# Patient Record
Sex: MALE | Race: WHITE | Employment: UNEMPLOYED | URBAN - NONMETROPOLITAN AREA
[De-identification: names, ages, dates, MRNs, and addresses within clinical notes are randomized per-mention and may not be internally consistent; named-entity substitution may affect disease eponyms.]

---

## 2021-09-14 ENCOUNTER — TELEPHONE (OUTPATIENT)
Dept: UROLOGY | Age: 41
End: 2021-09-14

## 2021-09-14 NOTE — TELEPHONE ENCOUNTER
Jeannette Jacobo from [address] [phone] called to schedule appt for this INMATE    Partial faxes received and requested disk with history of testicular carcinoma. (1) referral needs to be entered (2) Patient will need to be scheduled with Dr Theodora Ivey by calling Jeannette Jacobo (3) patient will need FULL registration before being seen.

## 2021-10-25 NOTE — TELEPHONE ENCOUNTER
Before scheduled as New Patient with Dr Zoe Grady, multiple attempts were made to acquire disk/film on patient before new patient appt today    Upon arrival at clinic, discovered that disk/film has not been received and/or jail transport did not bring disk/film. Upon deeper discussion with jail transport and patient - due to Vlad Hill MD not seeing any evidence of enlargement, jail would not be sending patient back to him for additional testing. Patient has documented enlargement though jail    Patient advised that last disk/film patient would have on record would be from [facility] in August 2020. [facility] staff took contact number, filled our request for information and sent to [facility] facility. [address] [place], [place] [address]k staff was given a [place] contact number [phone] to speak with for DISK/FILM.     [facility] staff cancelled patient for today, re-opened referral and will wait until received disk/film until rescheduling appt

## 2022-01-05 ENCOUNTER — OFFICE VISIT (OUTPATIENT)
Dept: UROLOGY | Age: 42
End: 2022-01-05
Payer: COMMERCIAL

## 2022-01-05 VITALS — TEMPERATURE: 98.2 F | BODY MASS INDEX: 33.02 KG/M2 | HEIGHT: 67 IN | WEIGHT: 210.4 LBS

## 2022-01-05 DIAGNOSIS — N50.89 MASS OF LEFT TESTICLE: Primary | ICD-10-CM

## 2022-01-05 LAB
APPEARANCE FLUID: CLEAR
BILIRUBIN, POC: NORMAL
BLOOD URINE, POC: NORMAL
CLARITY, POC: CLEAR
COLOR, POC: YELLOW
GLUCOSE URINE, POC: 100
KETONES, POC: NORMAL
LEUKOCYTE EST, POC: NORMAL
NITRITE, POC: NORMAL
PH, POC: 5.5
PROTEIN, POC: NORMAL
SPECIFIC GRAVITY, POC: 1.02
UROBILINOGEN, POC: 0.2

## 2022-01-05 PROCEDURE — 81002 URINALYSIS NONAUTO W/O SCOPE: CPT | Performed by: UROLOGY

## 2022-01-05 PROCEDURE — 99204 OFFICE O/P NEW MOD 45 MIN: CPT | Performed by: UROLOGY

## 2022-01-05 ASSESSMENT — ENCOUNTER SYMPTOMS
CHEST TIGHTNESS: 0
NAUSEA: 0
EYE DISCHARGE: 0
VOMITING: 0
EYE REDNESS: 0
WHEEZING: 0
BACK PAIN: 0
SORE THROAT: 0
FACIAL SWELLING: 0

## 2022-01-05 NOTE — PROGRESS NOTES
Jazmyn Sanderson is a 37 y.o. male who presents today   Chief Complaint   Patient presents with    Follow-up     I am a new pt today being seen for abnormal scrotal us. I brought my disc. Testicular mass  Patient is an inmate in the correctional facility in [place]. He states initially saw a urologist April 2020 for complaints of a left testicular nodule. I reviewed the records there was complaint of testalgia but no nodule was seen or palpated. This left testicle increased in size and patient underwent a testicular ultrasound done on 8/26/2021 and this showed hypoechoic masses intraparenchymal in the left testis. Referral was made. His initial visit October 2021 he did not have his ultrasound disc with him so this was rescheduled. He is  just now here for follow-up evaluation. He denies any weight loss or constitutional symptoms no abdominal pain he says he did have some back pain doing some exercising. He reports the left testicle has gotten larger in size creates a sort of pulling sensation but he is not having any excruciating pain no erythema no difficulty urinating or dysuria. He did have some labs drawn on 12/14/2021 which included a CMP showed normal LFTs creatinine 1.28 GFR greater than 60 H&H was 15.1 and 42.6 white blood cell count 3.9. Alpha-fetoprotein tumor marker was normal at 2.1 and total quantitative hCG was 9 just slightly elevated. He does bring his disc in today. History reviewed. No pertinent past medical history. Past Surgical History:   Procedure Laterality Date    FEMUR FRACTURE SURGERY Left     HEMORRHOID SURGERY  2020    internal hemorrhoids. Jany        No current outpatient medications on file. No current facility-administered medications for this visit.        Allergies   Allergen Reactions    Aspirin Rash       Social History     Socioeconomic History    Marital status: Single     Spouse name: None    Number of children: None    Years of education: None    Highest education level: None   Occupational History    None   Tobacco Use    Smoking status: Never Smoker    Smokeless tobacco: Never Used   Substance and Sexual Activity    Alcohol use: None    Drug use: None    Sexual activity: None   Other Topics Concern    None   Social History Narrative    None     Social Determinants of Health     Financial Resource Strain:     Difficulty of Paying Living Expenses: Not on file   Food Insecurity:     Worried About Running Out of Food in the Last Year: Not on file    Jewels of Food in the Last Year: Not on file   Transportation Needs:     Lack of Transportation (Medical): Not on file    Lack of Transportation (Non-Medical): Not on file   Physical Activity:     Days of Exercise per Week: Not on file    Minutes of Exercise per Session: Not on file   Stress:     Feeling of Stress : Not on file   Social Connections:     Frequency of Communication with Friends and Family: Not on file    Frequency of Social Gatherings with Friends and Family: Not on file    Attends Synagogue Services: Not on file    Active Member of [address] [place] [address] or Organizations: Not on file    Attends Club or Organization Meetings: Not on file    Marital Status: Not on file   Intimate Partner Violence:     Fear of Current or Ex-Partner: Not on file    Emotionally Abused: Not on file    Physically Abused: Not on file    Sexually Abused: Not on file   Housing Stability:     Unable to Pay for Housing in the Last Year: Not on file    Number of [place] in the Last Year: Not on file    Unstable Housing in the Last Year: Not on file       History reviewed. No pertinent family history. REVIEW OF SYSTEMS:  Review of Systems   Constitutional: Negative for chills and fever. HENT: Negative for facial swelling and sore throat. Eyes: Negative for discharge and redness. Respiratory: Negative for chest tightness and wheezing. Cardiovascular: Negative for chest pain and palpitations. Gastrointestinal: Negative for nausea and vomiting. Endocrine: Negative for polyphagia and polyuria. Genitourinary: Positive for scrotal swelling (left side, papable reports it has grown over time. First found 1/19.  ). Negative for decreased urine volume, difficulty urinating, dysuria, enuresis, flank pain, frequency, genital sores, hematuria, penile discharge, penile pain, penile swelling, testicular pain and urgency. Musculoskeletal: Negative for back pain and neck stiffness. Skin: Negative for rash and wound. Neurological: Negative for dizziness and headaches. Hematological: Negative for adenopathy. Does not bruise/bleed easily. Psychiatric/Behavioral: Negative for confusion and hallucinations. PHYSICAL EXAM:  Temp 98.2 °F (36.8 °C)   Ht 5' 7\" (1.702 m)   Wt 210 lb 6.4 oz (95.4 kg)   BMI 32.95 kg/m²   Physical Exam  Constitutional:       General: He is not in acute distress. Appearance: Normal appearance. He is well-developed. HENT:      Head: Normocephalic and atraumatic. Nose: Nose normal.   Eyes:      General: No scleral icterus. Conjunctiva/sclera: Conjunctivae normal.      Pupils: Pupils are equal, round, and reactive to light. Neck:      Trachea: No tracheal deviation. Cardiovascular:      Rate and Rhythm: Normal rate and regular rhythm. Pulmonary:      Effort: Pulmonary effort is normal. No respiratory distress. Breath sounds: No stridor. Abdominal:      General: There is no distension. Palpations: Abdomen is soft. There is no mass (He has some mild fullness in the). Tenderness: There is no abdominal tenderness. Comments: He has firmness on deep palpation mid supra umbilical to deep palpation can not r/o mass   Genitourinary:     Penis: Normal and circumcised. Testes:         Right: Mass not present. Left: Mass present. Tenderness not present. Comments: Firm irregular mass involving the left testicle.   Normal scrotal skin.  Musculoskeletal:         General: No tenderness. Normal range of motion. Cervical back: Normal range of motion and neck supple. Lymphadenopathy:      Cervical: No cervical adenopathy. Skin:     General: Skin is warm and dry. Findings: No erythema. Neurological:      Mental Status: He is alert and oriented to person, place, and time. Psychiatric:         Behavior: Behavior normal.         Judgment: Judgment normal.              DATA:    Results for orders placed or performed in visit on 01/05/22   POCT Urinalysis no Micro   Result Value Ref Range    Color, UA yellow     Clarity, UA clear     Glucose, UA      Bilirubin, UA neg     Ketones, UA neg     Spec Grav, UA 1.020     Blood, UA POC neg     pH, UA 5.5     Protein, UA POC neg     Urobilinogen, UA 0.2     Leukocytes, UA neg     Nitrite, UA neg     Appearance, Fluid Clear Clear, Slightly Cloudy     Labs none at the present on 12/13/2021 were reviewed as above. Normal alpha-fetoprotein 2.1  Beta hCG was 9. Normal <5 very slightly elevated    IMAGING:  He does bring the disc in today I was able to examine that this is from August 26, 2021 this does show enlargement of the left testicle with a 2.4 and 2 cm intraparenchymal hypoechoic masses. 1. Mass of left testicle  History and physical findings are consistent with probable testis cancer without marker elevation. I will go ahead and order staging chest CT scan of the abdomen pelvis since there is delayed presentation. In addition need to get him scheduled ASAP for left radical orchiectomy inguinal approach. - POCT Urinalysis no Micro  - CT CHEST W CONTRAST;  Future  - CT ABDOMEN PELVIS W WO CONTRAST Additional Contrast? Oral; Future      Orders Placed This Encounter   Procedures    CT CHEST W CONTRAST     Standing Status:   Future     Standing Expiration Date:   1/5/2023     Order Specific Question:   Reason for exam:     Answer:   Rule out pulmonary metastasis    CT ABDOMEN PELVIS W WO CONTRAST Additional Contrast? Oral     Standing Status:   Future     Standing Expiration Date:   1/5/2023     Order Specific Question:   Additional Contrast?     Answer:   Oral     Order Specific Question:   Reason for exam:     Answer:   Rule out retroperitoneal metastasis    POCT Urinalysis no Micro        Return for PT to be scheduled for Surgery. All information inputted into the note by the MA to include chief complaint, past medical history, past surgical history, medications, allergies, social and family history and review of systems has been reviewed and updated as needed by me. EMR Dragon/transcription disclaimer: Much of this documentt is electronic  transcription/translation of spoken language to printed text. The  electronic translation of spoken language may be erroneous, or at times,  nonsensical words or phrases may be inadvertently transcribed.  Although I  have reviewed the document for such errors, some may still exist.

## 2022-01-10 ENCOUNTER — TELEPHONE (OUTPATIENT)
Dept: UROLOGY | Age: 42
End: 2022-01-10

## 2022-01-10 NOTE — TELEPHONE ENCOUNTER
Patient surgery on for tomorrow 1/11/22 was cancelled by the [facility]. Per Rahul Angel all appts and surgeries must be approved first. I faxed note, ct orders, and all pre work orders to f: [phone] and informed Rahul Angel that this needed to be reviewed STAT due to patient urgent diagnosis. Rahul Angel stated hopefully they would have approval by tomorrow and will call us back to schedule. Per Rahul Angel They will schedule all testing there and will have patient bring films on disc day of surgery.

## 2022-01-13 NOTE — TELEPHONE ENCOUNTER
I called the senior care again and spoke to [facility] to check the status of the approval for patient surgery. She stated it still had not been approved and would hopefully know something soon. I again stressed the urgency of the patients need for surgery. I also faxed a faced sheet stating patient ct scans can wait until after surgery to prevent any delay per Dr Eyal Tran.

## 2022-01-19 ENCOUNTER — TELEPHONE (OUTPATIENT)
Dept: UROLOGY | Age: 42
End: 2022-01-19

## 2022-01-19 NOTE — TELEPHONE ENCOUNTER
Leslee from the [facility] scheduling Dept request that the office return her call regarding when the patient should arrive for his scheduled surgery on 2/1. Also when should the patient have his CT of the abdomen and pelvis done. She also ask to be contacted first in regards to scheduling his post op appt. Please return call to Leslee. Thank you!     DF:[phone]

## 2022-02-01 ENCOUNTER — APPOINTMENT (OUTPATIENT)
Dept: CT IMAGING | Age: 42
End: 2022-02-01
Attending: UROLOGY
Payer: COMMERCIAL

## 2022-02-01 ENCOUNTER — HOSPITAL ENCOUNTER (OUTPATIENT)
Age: 42
Setting detail: OUTPATIENT SURGERY
Discharge: HOME | End: 2022-02-01
Attending: UROLOGY | Admitting: UROLOGY
Payer: COMMERCIAL

## 2022-02-01 ENCOUNTER — ANESTHESIA EVENT (OUTPATIENT)
Dept: OPERATING ROOM | Age: 42
End: 2022-02-01
Payer: COMMERCIAL

## 2022-02-01 ENCOUNTER — ANESTHESIA (OUTPATIENT)
Dept: OPERATING ROOM | Age: 42
End: 2022-02-01
Payer: COMMERCIAL

## 2022-02-01 VITALS
SYSTOLIC BLOOD PRESSURE: 152 MMHG | HEART RATE: 69 BPM | TEMPERATURE: 98.2 F | BODY MASS INDEX: 32.96 KG/M2 | WEIGHT: 210 LBS | RESPIRATION RATE: 16 BRPM | OXYGEN SATURATION: 97 % | HEIGHT: 67 IN | DIASTOLIC BLOOD PRESSURE: 98 MMHG

## 2022-02-01 VITALS — OXYGEN SATURATION: 99 % | TEMPERATURE: 97.6 F | DIASTOLIC BLOOD PRESSURE: 52 MMHG | SYSTOLIC BLOOD PRESSURE: 95 MMHG

## 2022-02-01 DIAGNOSIS — N50.89 MASS OF LEFT TESTICLE: Primary | ICD-10-CM

## 2022-02-01 LAB
ALBUMIN SERPL-MCNC: 4.9 G/DL (ref 3.5–5.2)
ALP BLD-CCNC: 98 U/L (ref 40–130)
ALPHA FETOPROTEIN: 2.3 NG/ML (ref 0–8.3)
ALT SERPL-CCNC: 41 U/L (ref 5–41)
ANION GAP SERPL CALCULATED.3IONS-SCNC: 10 MMOL/L (ref 7–19)
APTT: 26.4 SEC (ref 26–36.2)
AST SERPL-CCNC: 29 U/L (ref 5–40)
BASOPHILS ABSOLUTE: 0.1 K/UL (ref 0–0.2)
BASOPHILS RELATIVE PERCENT: 0.9 % (ref 0–1)
BILIRUB SERPL-MCNC: 0.7 MG/DL (ref 0.2–1.2)
BUN BLDV-MCNC: 11 MG/DL (ref 6–20)
CALCIUM SERPL-MCNC: 9.6 MG/DL (ref 8.6–10)
CHLORIDE BLD-SCNC: 100 MMOL/L (ref 98–111)
CO2: 26 MMOL/L (ref 22–29)
CREAT SERPL-MCNC: 1 MG/DL (ref 0.5–1.2)
EOSINOPHILS ABSOLUTE: 0.3 K/UL (ref 0–0.6)
EOSINOPHILS RELATIVE PERCENT: 5.8 % (ref 0–5)
GFR AFRICAN AMERICAN: >59
GFR NON-AFRICAN AMERICAN: >60
GLUCOSE BLD-MCNC: 97 MG/DL (ref 74–109)
HCT VFR BLD CALC: 49.1 % (ref 42–52)
HEMOGLOBIN: 16.1 G/DL (ref 14–18)
IMMATURE GRANULOCYTES #: 0 K/UL
INR BLD: 0.97 (ref 0.88–1.18)
LYMPHOCYTES ABSOLUTE: 1.6 K/UL (ref 1.1–4.5)
LYMPHOCYTES RELATIVE PERCENT: 28.4 % (ref 20–40)
MCH RBC QN AUTO: 29.8 PG (ref 27–31)
MCHC RBC AUTO-ENTMCNC: 32.8 G/DL (ref 33–37)
MCV RBC AUTO: 90.9 FL (ref 80–94)
MONOCYTES ABSOLUTE: 0.6 K/UL (ref 0–0.9)
MONOCYTES RELATIVE PERCENT: 10.8 % (ref 0–10)
NEUTROPHILS ABSOLUTE: 3.1 K/UL (ref 1.5–7.5)
NEUTROPHILS RELATIVE PERCENT: 53.6 % (ref 50–65)
PDW BLD-RTO: 11.9 % (ref 11.5–14.5)
PLATELET # BLD: 203 K/UL (ref 130–400)
PMV BLD AUTO: 9.9 FL (ref 9.4–12.4)
POTASSIUM SERPL-SCNC: 4.3 MMOL/L (ref 3.5–4.9)
PROTHROMBIN TIME: 13.1 SEC (ref 12–14.6)
RBC # BLD: 5.4 M/UL (ref 4.7–6.1)
SODIUM BLD-SCNC: 136 MMOL/L (ref 136–145)
TOTAL PROTEIN: 7.7 G/DL (ref 6.6–8.7)
WBC # BLD: 5.7 K/UL (ref 4.8–10.8)

## 2022-02-01 PROCEDURE — 6370000000 HC RX 637 (ALT 250 FOR IP): Performed by: UROLOGY

## 2022-02-01 PROCEDURE — 7100000011 HC PHASE II RECOVERY - ADDTL 15 MIN: Performed by: UROLOGY

## 2022-02-01 PROCEDURE — 2580000003 HC RX 258: Performed by: UROLOGY

## 2022-02-01 PROCEDURE — 2500000003 HC RX 250 WO HCPCS: Performed by: UROLOGY

## 2022-02-01 PROCEDURE — 82105 ALPHA-FETOPROTEIN SERUM: CPT

## 2022-02-01 PROCEDURE — 88309 TISSUE EXAM BY PATHOLOGIST: CPT

## 2022-02-01 PROCEDURE — 54530 REMOVAL OF TESTIS: CPT | Performed by: UROLOGY

## 2022-02-01 PROCEDURE — 74177 CT ABD & PELVIS W/CONTRAST: CPT

## 2022-02-01 PROCEDURE — 3700000000 HC ANESTHESIA ATTENDED CARE: Performed by: UROLOGY

## 2022-02-01 PROCEDURE — 3600000005 HC SURGERY LEVEL 5 BASE: Performed by: UROLOGY

## 2022-02-01 PROCEDURE — 2500000003 HC RX 250 WO HCPCS: Performed by: NURSE ANESTHETIST, CERTIFIED REGISTERED

## 2022-02-01 PROCEDURE — 7100000001 HC PACU RECOVERY - ADDTL 15 MIN: Performed by: UROLOGY

## 2022-02-01 PROCEDURE — 6360000004 HC RX CONTRAST MEDICATION: Performed by: UROLOGY

## 2022-02-01 PROCEDURE — 3700000001 HC ADD 15 MINUTES (ANESTHESIA): Performed by: UROLOGY

## 2022-02-01 PROCEDURE — 6360000002 HC RX W HCPCS: Performed by: UROLOGY

## 2022-02-01 PROCEDURE — 85730 THROMBOPLASTIN TIME PARTIAL: CPT

## 2022-02-01 PROCEDURE — 85610 PROTHROMBIN TIME: CPT

## 2022-02-01 PROCEDURE — 7100000010 HC PHASE II RECOVERY - FIRST 15 MIN: Performed by: UROLOGY

## 2022-02-01 PROCEDURE — 7100000000 HC PACU RECOVERY - FIRST 15 MIN: Performed by: UROLOGY

## 2022-02-01 PROCEDURE — 36415 COLL VENOUS BLD VENIPUNCTURE: CPT

## 2022-02-01 PROCEDURE — 71260 CT THORAX DX C+: CPT

## 2022-02-01 PROCEDURE — 2580000003 HC RX 258: Performed by: NURSE ANESTHETIST, CERTIFIED REGISTERED

## 2022-02-01 PROCEDURE — A4217 STERILE WATER/SALINE, 500 ML: HCPCS | Performed by: UROLOGY

## 2022-02-01 PROCEDURE — 3600000015 HC SURGERY LEVEL 5 ADDTL 15MIN: Performed by: UROLOGY

## 2022-02-01 PROCEDURE — 85025 COMPLETE CBC W/AUTO DIFF WBC: CPT

## 2022-02-01 PROCEDURE — 84704 HCG FREE BETACHAIN TEST: CPT

## 2022-02-01 PROCEDURE — 80053 COMPREHEN METABOLIC PANEL: CPT

## 2022-02-01 PROCEDURE — 6360000002 HC RX W HCPCS: Performed by: NURSE ANESTHETIST, CERTIFIED REGISTERED

## 2022-02-01 PROCEDURE — 2709999900 HC NON-CHARGEABLE SUPPLY: Performed by: UROLOGY

## 2022-02-01 RX ORDER — PROPOFOL 10 MG/ML
INJECTION, EMULSION INTRAVENOUS PRN
Status: DISCONTINUED | OUTPATIENT
Start: 2022-02-01 | End: 2022-02-01 | Stop reason: SDUPTHER

## 2022-02-01 RX ORDER — HYDROMORPHONE HYDROCHLORIDE 1 MG/ML
0.5 INJECTION, SOLUTION INTRAMUSCULAR; INTRAVENOUS; SUBCUTANEOUS EVERY 5 MIN PRN
Status: DISCONTINUED | OUTPATIENT
Start: 2022-02-01 | End: 2022-02-01 | Stop reason: HOSPADM

## 2022-02-01 RX ORDER — SODIUM CHLORIDE 9 MG/ML
INJECTION, SOLUTION INTRAVENOUS CONTINUOUS
Status: DISCONTINUED | OUTPATIENT
Start: 2022-02-01 | End: 2022-02-01 | Stop reason: HOSPADM

## 2022-02-01 RX ORDER — ONDANSETRON 2 MG/ML
INJECTION INTRAMUSCULAR; INTRAVENOUS PRN
Status: DISCONTINUED | OUTPATIENT
Start: 2022-02-01 | End: 2022-02-01 | Stop reason: SDUPTHER

## 2022-02-01 RX ORDER — DEXAMETHASONE SODIUM PHOSPHATE 10 MG/ML
10 INJECTION, SOLUTION INTRAMUSCULAR; INTRAVENOUS ONCE
Status: DISCONTINUED | OUTPATIENT
Start: 2022-02-01 | End: 2022-02-01 | Stop reason: HOSPADM

## 2022-02-01 RX ORDER — MIDAZOLAM HYDROCHLORIDE 1 MG/ML
2 INJECTION INTRAMUSCULAR; INTRAVENOUS
Status: DISCONTINUED | OUTPATIENT
Start: 2022-02-01 | End: 2022-02-01 | Stop reason: HOSPADM

## 2022-02-01 RX ORDER — ROCURONIUM BROMIDE 10 MG/ML
INJECTION, SOLUTION INTRAVENOUS PRN
Status: DISCONTINUED | OUTPATIENT
Start: 2022-02-01 | End: 2022-02-01 | Stop reason: SDUPTHER

## 2022-02-01 RX ORDER — HYDROMORPHONE HYDROCHLORIDE 1 MG/ML
0.5 INJECTION, SOLUTION INTRAMUSCULAR; INTRAVENOUS; SUBCUTANEOUS
Status: DISCONTINUED | OUTPATIENT
Start: 2022-02-01 | End: 2022-02-01 | Stop reason: HOSPADM

## 2022-02-01 RX ORDER — LIDOCAINE HYDROCHLORIDE 10 MG/ML
INJECTION, SOLUTION INFILTRATION; PERINEURAL PRN
Status: DISCONTINUED | OUTPATIENT
Start: 2022-02-01 | End: 2022-02-01 | Stop reason: SDUPTHER

## 2022-02-01 RX ORDER — HYDROMORPHONE HYDROCHLORIDE 1 MG/ML
0.25 INJECTION, SOLUTION INTRAMUSCULAR; INTRAVENOUS; SUBCUTANEOUS EVERY 5 MIN PRN
Status: DISCONTINUED | OUTPATIENT
Start: 2022-02-01 | End: 2022-02-01 | Stop reason: HOSPADM

## 2022-02-01 RX ORDER — FENTANYL CITRATE 50 UG/ML
25 INJECTION, SOLUTION INTRAMUSCULAR; INTRAVENOUS EVERY 5 MIN PRN
Status: DISCONTINUED | OUTPATIENT
Start: 2022-02-01 | End: 2022-02-01 | Stop reason: HOSPADM

## 2022-02-01 RX ORDER — OXYCODONE HYDROCHLORIDE AND ACETAMINOPHEN 5; 325 MG/1; MG/1
1 TABLET ORAL EVERY 6 HOURS PRN
Qty: 20 TABLET | Refills: 0 | Status: SHIPPED | OUTPATIENT
Start: 2022-02-01 | End: 2022-02-06

## 2022-02-01 RX ORDER — LABETALOL HYDROCHLORIDE 5 MG/ML
5 INJECTION, SOLUTION INTRAVENOUS EVERY 10 MIN PRN
Status: DISCONTINUED | OUTPATIENT
Start: 2022-02-01 | End: 2022-02-01 | Stop reason: HOSPADM

## 2022-02-01 RX ORDER — PROCHLORPERAZINE EDISYLATE 5 MG/ML
5 INJECTION INTRAMUSCULAR; INTRAVENOUS
Status: DISCONTINUED | OUTPATIENT
Start: 2022-02-01 | End: 2022-02-01 | Stop reason: HOSPADM

## 2022-02-01 RX ORDER — ONDANSETRON 2 MG/ML
4 INJECTION INTRAMUSCULAR; INTRAVENOUS EVERY 4 HOURS PRN
Status: DISCONTINUED | OUTPATIENT
Start: 2022-02-01 | End: 2022-02-01 | Stop reason: HOSPADM

## 2022-02-01 RX ORDER — BUPIVACAINE HYDROCHLORIDE 5 MG/ML
INJECTION, SOLUTION EPIDURAL; INTRACAUDAL PRN
Status: DISCONTINUED | OUTPATIENT
Start: 2022-02-01 | End: 2022-02-01 | Stop reason: ALTCHOICE

## 2022-02-01 RX ORDER — MIDAZOLAM HYDROCHLORIDE 1 MG/ML
INJECTION INTRAMUSCULAR; INTRAVENOUS PRN
Status: DISCONTINUED | OUTPATIENT
Start: 2022-02-01 | End: 2022-02-01 | Stop reason: SDUPTHER

## 2022-02-01 RX ORDER — MEPERIDINE HYDROCHLORIDE 25 MG/ML
12.5 INJECTION INTRAMUSCULAR; INTRAVENOUS; SUBCUTANEOUS EVERY 5 MIN PRN
Status: DISCONTINUED | OUTPATIENT
Start: 2022-02-01 | End: 2022-02-01 | Stop reason: HOSPADM

## 2022-02-01 RX ORDER — FENTANYL CITRATE 50 UG/ML
INJECTION, SOLUTION INTRAMUSCULAR; INTRAVENOUS PRN
Status: DISCONTINUED | OUTPATIENT
Start: 2022-02-01 | End: 2022-02-01 | Stop reason: SDUPTHER

## 2022-02-01 RX ORDER — LIDOCAINE HYDROCHLORIDE 10 MG/ML
1 INJECTION, SOLUTION EPIDURAL; INFILTRATION; INTRACAUDAL; PERINEURAL
Status: DISCONTINUED | OUTPATIENT
Start: 2022-02-01 | End: 2022-02-01 | Stop reason: HOSPADM

## 2022-02-01 RX ORDER — LIDOCAINE HYDROCHLORIDE 10 MG/ML
INJECTION, SOLUTION INFILTRATION; PERINEURAL PRN
Status: DISCONTINUED | OUTPATIENT
Start: 2022-02-01 | End: 2022-02-01 | Stop reason: ALTCHOICE

## 2022-02-01 RX ORDER — ENALAPRILAT 2.5 MG/2ML
1.25 INJECTION INTRAVENOUS
Status: DISCONTINUED | OUTPATIENT
Start: 2022-02-01 | End: 2022-02-01 | Stop reason: HOSPADM

## 2022-02-01 RX ORDER — OXYCODONE HYDROCHLORIDE AND ACETAMINOPHEN 5; 325 MG/1; MG/1
2 TABLET ORAL EVERY 4 HOURS PRN
Status: DISCONTINUED | OUTPATIENT
Start: 2022-02-01 | End: 2022-02-01 | Stop reason: HOSPADM

## 2022-02-01 RX ORDER — SUCCINYLCHOLINE CHLORIDE 20 MG/ML
INJECTION INTRAMUSCULAR; INTRAVENOUS PRN
Status: DISCONTINUED | OUTPATIENT
Start: 2022-02-01 | End: 2022-02-01 | Stop reason: SDUPTHER

## 2022-02-01 RX ORDER — ONDANSETRON 2 MG/ML
4 INJECTION INTRAMUSCULAR; INTRAVENOUS
Status: DISCONTINUED | OUTPATIENT
Start: 2022-02-01 | End: 2022-02-01 | Stop reason: HOSPADM

## 2022-02-01 RX ORDER — SODIUM CHLORIDE 0.9 % (FLUSH) 0.9 %
5-40 SYRINGE (ML) INJECTION EVERY 12 HOURS SCHEDULED
Status: DISCONTINUED | OUTPATIENT
Start: 2022-02-01 | End: 2022-02-01 | Stop reason: HOSPADM

## 2022-02-01 RX ORDER — FENTANYL CITRATE 50 UG/ML
50 INJECTION, SOLUTION INTRAMUSCULAR; INTRAVENOUS EVERY 5 MIN PRN
Status: DISCONTINUED | OUTPATIENT
Start: 2022-02-01 | End: 2022-02-01 | Stop reason: HOSPADM

## 2022-02-01 RX ORDER — DIPHENHYDRAMINE HYDROCHLORIDE 50 MG/ML
12.5 INJECTION INTRAMUSCULAR; INTRAVENOUS
Status: DISCONTINUED | OUTPATIENT
Start: 2022-02-01 | End: 2022-02-01 | Stop reason: HOSPADM

## 2022-02-01 RX ORDER — SODIUM CHLORIDE 9 MG/ML
25 INJECTION, SOLUTION INTRAVENOUS PRN
Status: DISCONTINUED | OUTPATIENT
Start: 2022-02-01 | End: 2022-02-01 | Stop reason: HOSPADM

## 2022-02-01 RX ORDER — GLYCOPYRROLATE 0.2 MG/ML
INJECTION INTRAMUSCULAR; INTRAVENOUS PRN
Status: DISCONTINUED | OUTPATIENT
Start: 2022-02-01 | End: 2022-02-01

## 2022-02-01 RX ORDER — SODIUM CHLORIDE, SODIUM LACTATE, POTASSIUM CHLORIDE, CALCIUM CHLORIDE 600; 310; 30; 20 MG/100ML; MG/100ML; MG/100ML; MG/100ML
INJECTION, SOLUTION INTRAVENOUS CONTINUOUS PRN
Status: DISCONTINUED | OUTPATIENT
Start: 2022-02-01 | End: 2022-02-01 | Stop reason: SDUPTHER

## 2022-02-01 RX ORDER — SODIUM CHLORIDE 0.9 % (FLUSH) 0.9 %
5-40 SYRINGE (ML) INJECTION PRN
Status: DISCONTINUED | OUTPATIENT
Start: 2022-02-01 | End: 2022-02-01 | Stop reason: HOSPADM

## 2022-02-01 RX ORDER — FENTANYL CITRATE 50 UG/ML
25 INJECTION, SOLUTION INTRAMUSCULAR; INTRAVENOUS
Status: DISCONTINUED | OUTPATIENT
Start: 2022-02-01 | End: 2022-02-01 | Stop reason: HOSPADM

## 2022-02-01 RX ORDER — FENTANYL CITRATE 50 UG/ML
50 INJECTION, SOLUTION INTRAMUSCULAR; INTRAVENOUS
Status: DISCONTINUED | OUTPATIENT
Start: 2022-02-01 | End: 2022-02-01 | Stop reason: HOSPADM

## 2022-02-01 RX ORDER — SODIUM CHLORIDE, SODIUM LACTATE, POTASSIUM CHLORIDE, CALCIUM CHLORIDE 600; 310; 30; 20 MG/100ML; MG/100ML; MG/100ML; MG/100ML
INJECTION, SOLUTION INTRAVENOUS CONTINUOUS
Status: DISCONTINUED | OUTPATIENT
Start: 2022-02-01 | End: 2022-02-01 | Stop reason: HOSPADM

## 2022-02-01 RX ORDER — DEXAMETHASONE SODIUM PHOSPHATE 10 MG/ML
INJECTION, SOLUTION INTRAMUSCULAR; INTRAVENOUS PRN
Status: DISCONTINUED | OUTPATIENT
Start: 2022-02-01 | End: 2022-02-01 | Stop reason: SDUPTHER

## 2022-02-01 RX ORDER — DIAPER,BRIEF,INFANT-TODD,DISP
EACH MISCELLANEOUS PRN
Status: DISCONTINUED | OUTPATIENT
Start: 2022-02-01 | End: 2022-02-01 | Stop reason: ALTCHOICE

## 2022-02-01 RX ORDER — HYDRALAZINE HYDROCHLORIDE 20 MG/ML
5 INJECTION INTRAMUSCULAR; INTRAVENOUS EVERY 10 MIN PRN
Status: DISCONTINUED | OUTPATIENT
Start: 2022-02-01 | End: 2022-02-01 | Stop reason: HOSPADM

## 2022-02-01 RX ADMIN — LIDOCAINE HYDROCHLORIDE 50 MG: 10 INJECTION, SOLUTION INFILTRATION; PERINEURAL at 14:54

## 2022-02-01 RX ADMIN — IOPAMIDOL 90 ML: 755 INJECTION, SOLUTION INTRAVENOUS at 11:43

## 2022-02-01 RX ADMIN — FENTANYL CITRATE 100 MCG: 50 INJECTION, SOLUTION INTRAMUSCULAR; INTRAVENOUS at 14:54

## 2022-02-01 RX ADMIN — ROCURONIUM BROMIDE 5 MG: 10 INJECTION, SOLUTION INTRAVENOUS at 14:54

## 2022-02-01 RX ADMIN — ROCURONIUM BROMIDE 45 MG: 10 INJECTION, SOLUTION INTRAVENOUS at 14:58

## 2022-02-01 RX ADMIN — MIDAZOLAM 2 MG: 1 INJECTION INTRAMUSCULAR; INTRAVENOUS at 14:44

## 2022-02-01 RX ADMIN — OXYCODONE HYDROCHLORIDE AND ACETAMINOPHEN 2 TABLET: 5; 325 TABLET ORAL at 17:48

## 2022-02-01 RX ADMIN — FENTANYL CITRATE 50 MCG: 50 INJECTION, SOLUTION INTRAMUSCULAR; INTRAVENOUS at 15:20

## 2022-02-01 RX ADMIN — CEFAZOLIN SODIUM 1000 MG: 1 INJECTION, POWDER, FOR SOLUTION INTRAMUSCULAR; INTRAVENOUS at 15:01

## 2022-02-01 RX ADMIN — SUGAMMADEX 250 MG: 100 INJECTION, SOLUTION INTRAVENOUS at 16:18

## 2022-02-01 RX ADMIN — SUCCINYLCHOLINE CHLORIDE 120 MG: 20 INJECTION, SOLUTION INTRAMUSCULAR; INTRAVENOUS at 14:54

## 2022-02-01 RX ADMIN — FENTANYL CITRATE 50 MCG: 50 INJECTION, SOLUTION INTRAMUSCULAR; INTRAVENOUS at 15:43

## 2022-02-01 RX ADMIN — SODIUM CHLORIDE, SODIUM LACTATE, POTASSIUM CHLORIDE, AND CALCIUM CHLORIDE: 600; 310; 30; 20 INJECTION, SOLUTION INTRAVENOUS at 14:46

## 2022-02-01 RX ADMIN — SODIUM CHLORIDE, SODIUM LACTATE, POTASSIUM CHLORIDE, AND CALCIUM CHLORIDE: 600; 310; 30; 20 INJECTION, SOLUTION INTRAVENOUS at 15:08

## 2022-02-01 RX ADMIN — HYDROMORPHONE HYDROCHLORIDE 0.5 MG: 1 INJECTION, SOLUTION INTRAMUSCULAR; INTRAVENOUS; SUBCUTANEOUS at 16:53

## 2022-02-01 RX ADMIN — PROPOFOL 200 MG: 10 INJECTION, EMULSION INTRAVENOUS at 14:54

## 2022-02-01 RX ADMIN — DEXAMETHASONE SODIUM PHOSPHATE 10 MG: 10 INJECTION, SOLUTION INTRAMUSCULAR; INTRAVENOUS at 14:59

## 2022-02-01 RX ADMIN — ONDANSETRON 4 MG: 2 INJECTION INTRAMUSCULAR; INTRAVENOUS at 15:57

## 2022-02-01 ASSESSMENT — PAIN DESCRIPTION - ORIENTATION: ORIENTATION: LEFT

## 2022-02-01 ASSESSMENT — PAIN SCALES - GENERAL
PAINLEVEL_OUTOF10: 5
PAINLEVEL_OUTOF10: 5
PAINLEVEL_OUTOF10: 4

## 2022-02-01 ASSESSMENT — PAIN DESCRIPTION - LOCATION: LOCATION: GROIN

## 2022-02-01 ASSESSMENT — PAIN DESCRIPTION - PAIN TYPE: TYPE: SURGICAL PAIN

## 2022-02-01 ASSESSMENT — PAIN DESCRIPTION - DESCRIPTORS: DESCRIPTORS: DISCOMFORT

## 2022-02-01 NOTE — ANESTHESIA PRE PROCEDURE
Department of Anesthesiology  Preprocedure Note       Name:  Cisco Lopez   Age:  40 y.o.  :  1980                                          MRN:  [id number]         Date:  2022      Surgeon: Sharonda Pi):  Minor Salazar MD    Procedure: Procedure(s):  RADICAL ORCHIECTOMY    Medications prior to admission:   Prior to Admission medications    Not on File       Current medications:    Current Facility-Administered Medications   Medication Dose Route Frequency Provider Last Rate Last Admin    ceFAZolin (ANCEF) 1,000 mg in sterile water 10 mL IV syringe  1,000 mg IntraVENous Once Minor Salazar MD           Allergies: Allergies   Allergen Reactions    Aspirin Rash       Problem List:  There is no problem list on file for this patient. Past Medical History:        Diagnosis Date    Mass of left testicle        Past Surgical History:        Procedure Laterality Date    FEMUR FRACTURE SURGERY Left     HEMORRHOID SURGERY  2020    internal hemorrhoids. Vida Oreilly        Social History:    Social History     Tobacco Use    Smoking status: Never Smoker    Smokeless tobacco: Never Used   Substance Use Topics    Alcohol use: Not on file                                Counseling given: Not Answered      Vital Signs (Current):   Vitals:    22 1117   BP: 130/82   Pulse: 56   Resp: 16   Temp: 97.5 °F (36.4 °C)   TempSrc: Temporal   SpO2: 100%   Weight: 210 lb (95.3 kg)   Height: 5' 7\" (1.702 m)                                              BP Readings from Last 3 Encounters:   22 130/82       NPO Status:                                                                                 BMI:   Wt Readings from Last 3 Encounters:   22 210 lb (95.3 kg)   22 210 lb 6.4 oz (95.4 kg)     Body mass index is 32.89 kg/m².     CBC: No results found for: WBC, RBC, HGB, HCT, MCV, RDW, PLT    CMP: No results found for: NA, K, CL, CO2, BUN, CREATININE, GFRAA, AGRATIO, LABGLOM, GLUCOSE, GLU, PROT, CALCIUM, BILITOT, ALKPHOS, AST, ALT    POC Tests: No results for input(s): POCGLU, POCNA, POCK, POCCL, POCBUN, POCHEMO, POCHCT in the last 72 hours. Coags: No results found for: PROTIME, INR, APTT    HCG (If Applicable): No results found for: PREGTESTUR, PREGSERUM, HCG, HCGQUANT     ABGs: No results found for: PHART, PO2ART, EHC4SLA, ASD4YDM, BEART, P8ZSPCNS     Type & Screen (If Applicable):  No results found for: LABABO, LABRH    Drug/Infectious Status (If Applicable):  No results found for: HIV, HEPCAB    COVID-19 Screening (If Applicable): No results found for: COVID19        Anesthesia Evaluation  Patient summary reviewed and Nursing notes reviewed no history of anesthetic complications:   Airway: Mallampati: I  TM distance: >3 FB   Neck ROM: full  Comment: Full beard  Mouth opening: > = 3 FB Dental: normal exam         Pulmonary:Negative Pulmonary ROS                              Cardiovascular:  Exercise tolerance: good (>4 METS),            Beta Blocker:  Not on Beta Blocker         Neuro/Psych:   Negative Neuro/Psych ROS              GI/Hepatic/Renal: Neg GI/Hepatic/Renal ROS            Endo/Other:                      ROS comment: Left testicular mass Abdominal:             Vascular: negative vascular ROS. Other Findings:           Anesthesia Plan      general     ASA 1     (Pepcid and decadron in preop.)  Induction: intravenous. MIPS: Postoperative opioids intended and Prophylactic antiemetics administered. Anesthetic plan and risks discussed with patient.                     Siria Jackson DO   2/1/2022

## 2022-02-01 NOTE — ANESTHESIA POSTPROCEDURE EVALUATION
Department of Anesthesiology  Postprocedure Note    Patient: Joana Melendez  MRN: [id number]  YOB: 1980  Date of evaluation: 2/1/2022  Time:  4:59 PM     Procedure Summary     Date: 02/01/22 Room / Location: [address]    Anesthesia Start: 1068 Anesthesia Stop: 1245    Procedure: RADICAL ORCHIECTOMY (Left Abdomen) Diagnosis: (LEFT TESTICULAR MASS)    Surgeons: Amanda Shields MD Responsible Provider: IONA Villalpando CRNA    Anesthesia Type: general ASA Status: 1          Anesthesia Type: general    Haider Phase I: Haider Score: 10    Haider Phase II:      Last vitals: Reviewed and per EMR flowsheets.        Anesthesia Post Evaluation    Patient location during evaluation: PACU  Patient participation: complete - patient participated  Level of consciousness: awake and alert  Pain score: 0  Airway patency: patent  Nausea & Vomiting: no nausea and no vomiting  Complications: no  Cardiovascular status: blood pressure returned to baseline  Respiratory status: acceptable, spontaneous ventilation and nasal cannula  Hydration status: stable

## 2022-02-01 NOTE — INTERVAL H&P NOTE
Update History & Physical    The patient's History and Physical of January 5, 2022 was reviewed with the patient and I examined the patient. There was no change. The surgical site was confirmed by the patient and me. Plan: The risks, benefits, expected outcome, and alternative to the recommended procedure have been discussed with the patient. Patient understands and wants to proceed with the procedure.      Electronically signed by Ajit Davies MD on 2/1/2022 at 2:18 PM

## 2022-02-01 NOTE — H&P
Aubree Martinez is a 37 y.o. male who presents today        Chief Complaint   Patient presents with    Follow-up       I am a new pt today being seen for abnormal scrotal us. I brought my disc.      Testicular mass  Patient is an inmate in the correctional facility in [place]. He states initially saw a urologist April 2020 for complaints of a left testicular nodule. I reviewed the records there was complaint of testalgia but no nodule was seen or palpated. This left testicle increased in size and patient underwent a testicular ultrasound done on 8/26/2021 and this showed hypoechoic masses intraparenchymal in the left testis. Referral was made. His initial visit October 2021 he did not have his ultrasound disc with him so this was rescheduled. He is  just now here for follow-up evaluation. He denies any weight loss or constitutional symptoms no abdominal pain he says he did have some back pain doing some exercising. He reports the left testicle has gotten larger in size creates a sort of pulling sensation but he is not having any excruciating pain no erythema no difficulty urinating or dysuria. He did have some labs drawn on 12/14/2021 which included a CMP showed normal LFTs creatinine 1.28 GFR greater than 60 H&H was 15.1 and 42.6 white blood cell count 3.9. Alpha-fetoprotein tumor marker was normal at 2.1 and total quantitative hCG was 9 just slightly elevated. He does bring his disc in today.        Past Medical History   History reviewed. No pertinent past medical history.        Past Surgical History         Past Surgical History:   Procedure Laterality Date    FEMUR FRACTURE SURGERY Left      HEMORRHOID SURGERY   2020     internal hemorrhoids.  Jany             Current Facility-Administered Medications   No current outpatient medications on file.      No current facility-administered medications for this visit.                 Allergies   Allergen Reactions    Aspirin Rash         Social History   Social History            Socioeconomic History    Marital status: Single       Spouse name: None    Number of children: None    Years of education: None    Highest education level: None   Occupational History    None   Tobacco Use    Smoking status: Never Smoker    Smokeless tobacco: Never Used   Substance and Sexual Activity    Alcohol use: None    Drug use: None    Sexual activity: None   Other Topics Concern    None   Social History Narrative    None      Social Determinants of Health          Financial Resource Strain:     Difficulty of Paying Living Expenses: Not on file   Food Insecurity:     Worried About Running Out of Food in the Last Year: Not on file    Jewels of Food in the Last Year: Not on file   Transportation Needs:     Lack of Transportation (Medical): Not on file    Lack of Transportation (Non-Medical): Not on file   Physical Activity:     Days of Exercise per Week: Not on file    Minutes of Exercise per Session: Not on file   Stress:     Feeling of Stress : Not on file   Social Connections:     Frequency of Communication with Friends and Family: Not on file    Frequency of Social Gatherings with Friends and Family: Not on file    Attends [employer] Services: Not on file    Active Member of [address] [place] [address] or Organizations: Not on file    Attends Club or Organization Meetings: Not on file    Marital Status: Not on file   Intimate Partner Violence:     Fear of Current or Ex-Partner: Not on file    Emotionally Abused: Not on file    Physically Abused: Not on file    Sexually Abused: Not on file   Housing Stability:     Unable to Pay for Housing in the Last Year: Not on file    Number of [place] in the Last Year: Not on file    Unstable Housing in the Last Year: Not on file            Family History   History reviewed. No pertinent family history.        REVIEW OF SYSTEMS:  Review of Systems   Constitutional: Negative for chills and fever.    HENT: Negative for facial swelling and sore throat. Eyes: Negative for discharge and redness. Respiratory: Negative for chest tightness and wheezing. Cardiovascular: Negative for chest pain and palpitations. Gastrointestinal: Negative for nausea and vomiting. Endocrine: Negative for polyphagia and polyuria. Genitourinary: Positive for scrotal swelling (left side, papable reports it has grown over time. First found 1/19.  ). Negative for decreased urine volume, difficulty urinating, dysuria, enuresis, flank pain, frequency, genital sores, hematuria, penile discharge, penile pain, penile swelling, testicular pain and urgency. Musculoskeletal: Negative for back pain and neck stiffness. Skin: Negative for rash and wound. Neurological: Negative for dizziness and headaches. Hematological: Negative for adenopathy. Does not bruise/bleed easily. Psychiatric/Behavioral: Negative for confusion and hallucinations.            PHYSICAL EXAM:  Temp 98.2 °F (36.8 °C)   Ht 5' 7\" (1.702 m)   Wt 210 lb 6.4 oz (95.4 kg)   BMI 32.95 kg/m²   Physical Exam  Constitutional:       General: He is not in acute distress. Appearance: Normal appearance. He is well-developed. HENT:      Head: Normocephalic and atraumatic. Nose: Nose normal.   Eyes:      General: No scleral icterus. Conjunctiva/sclera: Conjunctivae normal.      Pupils: Pupils are equal, round, and reactive to light. Neck:      Trachea: No tracheal deviation. Cardiovascular:      Rate and Rhythm: Normal rate and regular rhythm. Pulmonary:      Effort: Pulmonary effort is normal. No respiratory distress. Breath sounds: No stridor. Abdominal:      General: There is no distension. Palpations: Abdomen is soft. There is no mass (He has some mild fullness in the). Tenderness: There is no abdominal tenderness.       Comments: He has firmness on deep palpation mid supra umbilical to deep palpation can not r/o mass   Genitourinary:     Penis: Normal and circumcised. Testes:         Right: Mass not present. Left: Mass present. Tenderness not present. Comments: Firm irregular mass involving the left testicle. Normal scrotal skin. Musculoskeletal:         General: No tenderness. Normal range of motion. Cervical back: Normal range of motion and neck supple. Lymphadenopathy:      Cervical: No cervical adenopathy. Skin:     General: Skin is warm and dry. Findings: No erythema. Neurological:      Mental Status: He is alert and oriented to person, place, and time. Psychiatric:         Behavior: Behavior normal.         Judgment: Judgment normal.                  DATA:           Results for orders placed or performed in visit on 01/05/22   POCT Urinalysis no Micro   Result Value Ref Range     Color, UA yellow       Clarity, UA clear       Glucose, UA        Bilirubin, UA neg       Ketones, UA neg       Spec Grav, UA 1.020       Blood, UA POC neg       pH, UA 5.5       Protein, UA POC neg       Urobilinogen, UA 0.2       Leukocytes, UA neg       Nitrite, UA neg       Appearance, Fluid Clear Clear, Slightly Cloudy      Labs none at the present on 12/13/2021 were reviewed as above. Normal alpha-fetoprotein 2.1  Beta hCG was 9. Normal <5 very slightly elevated     IMAGING:  He does bring the disc in today I was able to examine that this is from August 26, 2021 this does show enlargement of the left testicle with a 2.4 and 2 cm intraparenchymal hypoechoic masses. CT scan of the abdomen pelvis done today to 2/01/2022 shows  evidence of shotty left para-aortic retroperitoneal adenopathy with lymph nodes 1 cm and 1.3 cm which are not pathologically enlarged. No convincing evidence of retroperitoneal metastasis or abdominal metastasis. CT scan of the chest done today on 2/1/2022 shows no evidence of any pulmonary or mediastinal metastasis.     1. Enhancing left scrotal/testicular masses.  Correlation with outside testicular ultrasound recommended. No convincing intra-abdominal or   pelvic metastasis. Largest left periaortic retroperitoneal lymph node   measuring 10 mm in short axis. No pelvic lymphadenopathy. Signed by Dr Ning Prabhakar                    Impression    1. No evidence of intrathoracic metastasis. 2. 3 mm intrafissural lymph node suspected along the right minor   fissure. Signed by Dr Ning Prabhakar             1. Mass of left testicle  History and physical findings are consistent with probable testis cancer without marker elevation. I will go ahead and order staging chest CT scan of the abdomen pelvis since there is delayed presentation. In addition need to get him scheduled ASAP for left radical orchiectomy inguinal approach. - POCT Urinalysis no Micro  - CT CHEST W CONTRAST; Future  - CT ABDOMEN PELVIS W WO CONTRAST Additional Contrast? Oral; Future              Orders Placed This Encounter   Procedures    CT CHEST W CONTRAST       Standing Status:   Future       Standing Expiration Date:   1/5/2023       Order Specific Question:   Reason for exam:       Answer:   Rule out pulmonary metastasis    CT ABDOMEN PELVIS W WO CONTRAST Additional Contrast? Oral       Standing Status:   Future       Standing Expiration Date:   1/5/2023       Order Specific Question:   Additional Contrast?       Answer:   Oral       Order Specific Question:   Reason for exam:       Answer:   Rule out retroperitoneal metastasis    POCT Urinalysis no Micro         Return for PT to be scheduled for Surgery.     All information inputted into the note by the MA to include chief complaint, past medical history, past surgical history, medications, allergies, social and family history and review of systems has been reviewed and updated as needed by me.     EMR Dragon/transcription disclaimer: Much of this documentt is electronic  transcription/translation of spoken language to printed text.  The  electronic translation of spoken language may be erroneous, or at times,  nonsensical words or phrases may be inadvertently transcribed.  Although I  have reviewed the document for such errors, some may still exist.                Revision History

## 2022-02-01 NOTE — OP NOTE
Brief operative Note      Patient: Cisco Lopez  YOB: 1980  MRN: [id number]    Date of Procedure: 2/1/2022    Pre-Op Diagnosis: LEFT TESTICULAR MASS    Post-Op Diagnosis: Same       Procedure(s):  LEFT RADICAL ORCHIECTOMY    Surgeon(s):  Minor Salazar MD    Assistant:   * No surgical staff found *    Anesthesia: General    Estimated Blood Loss (mL): 5ml    Complications: None    Specimens:   ID Type Source Tests Collected by Time Destination   A : LEFT GONAD Tissue Scrotum SURGICAL PATHOLOGY Minor Salazar MD 2/1/2022 1611        Implants:  * No implants in log *      Drains: * No LDAs found *    Findings: Lobulated left testicle and testicular mass. No gross extension. Cord taken at the internal inguinal ring high ligation.     Detailed Description of Procedure:   See dictated report:  [id number]    Disposition to PACU then to op care outpatient follow-up in 2 weeks    Electronically signed by Sabina Marti MD on 2/1/2022 at 4:42 PM

## 2022-02-02 NOTE — OP NOTE
[facility] [employer] [facility] [address] [facility]                                OPERATIVE REPORT    PATIENT NAME: Elaine May                  :        1980  MED REC NO:   [id number]                              ROOM:  ACCOUNT NO:   [de-identified]                           ADMIT DATE: 2022  PROVIDER:     Mynor Campbell MD    DATE OF PROCEDURE:  2022    TITLE OF OPERATION:  Left radical orchiectomy. PREOPERATIVE DIAGNOSIS:  Left testicular mass. POSTOPERATIVE DIAGNOSIS:  Left testicular mass. ANESTHETIC:  General anesthetic. ESTIMATED BLOOD LOSS:  5 mL. ATTENDING SURGEON:  Mynor Campbell MD    HISTORY:  The patient is a 41-year-old gentleman who is in the [place] [facility] in [facility]. He claims he has had a small nodule  on his testicle back in . He did see a urologist in [facility] in  2020. At that time, it was documented that no nodule or mass was  palpated. The patient then subsequently had continuous enlargement of  the left testicle and underwent a testicular ultrasound on 2021,  which showed a hypoechoic mass, intraparenchymal involving the left  testicle. At his initial visit in October, he did not have the  ultrasound film; this had to be rescheduled. He eventually was seen in  consultation by me on . At that time, he had had  alpha-fetoprotein, which was normal and hCG was slightly elevated at 9. Otherwise, no repeat markers, with alpha-fetoprotein and hCG done today  are pending pre-orchiectomy. Examination revealed an enlarged, firm  left testicular mass. This was confirmed by ultrasound. He did undergo  a staging workup with a chest and abdominal CT scan. This was also done  today. This shows on abdominal CT scan some shotty left para-aortic  adenopathy up to 1.3 cm and some other lymph nodes of 1 cm.   There were  some lymph nodes there, but they are not pathologically enlarged. There  was otherwise no pulmonary or mediastinal metastasis or other abdominal  metastasis. He presents now to undergo a left radical orchiectomy. The  risks and complications of the procedure were discussed with him  including the risk of injury to the ilioinguinal nerve or nerve  entrapment causing chronic pain. We talked about hematoma and fluid  collection in the left hemiscrotal space after testicle was removed,  infection, abscess formation, hernia, chronic pain and need for  subsequent adjuvant treatments. He understands and agrees to proceed. DESCRIPTION OF PROCEDURE:  The patient was brought to the operating room  and underwent general anesthetic. He was on the table in the supine  position. His left inguinal region, which had been marked and confirmed  by me with the patient preoperatively and with the surgical team at the  time-out, was prepped and draped in a routine sterile fashion. Time-out  was performed and he received a preoperative antibiotic. The pubic  tubercle was identified and this was marked as well as the anterior  superior iliac spine. I then made a skin incision from just above the  pubic tubercle obliquely with a scalpel. This was taken down to the  subcutaneous tissue. Robbie's fascia was then opened and then  identified the aponeurosis of the external oblique fascia overlying the  inguinal canal.  I was a little bit medial but identified the ring and  the 15 blade was then used to open the fascia and I dissected until the  external ring was opened. The nerve was sort of more lateral and  posterior to the cord, so this was well away. I then dissected the cord  up off the pubic tubercle and was able to get my finger around it. I  then placed a Penrose drain completely around the cord circumferentially  and this was clamped.   I then carefully mobilized the distal portion of  the cord and opened up the external ring large enough that I could  deliver the testicle up out of the left hemiscrotum. This was then  delivered up out of the left hemiscrotum. The gubernaculum was divided  until the testicle was freed up from the scrotum. A sterile towel was  then placed on the field so that the testicle could be placed on the  towel. I then dissected the cord up off the floor taking down the  external spermatic and cremasteric fascia. I then identified the  internal ring as the oblique fibers of the internal oblique. I opened  this up for about 2 to 2.5 cm and placed a Orozco in the internal  ring so I could do a high dissection and high ligation. I then  dissected the cord until I could see the vas and the blood diverting  with the vas immediately and the blood supply laterally. I then divided  the cord in two bundles, one containing the vas and the other one  containing the blood supply. Right angle clamp was placed across the  blood vessel and _____ was placed across the vas. I then clamped the  cord just distal to this and the cord was then divided with a cautery. I then ligated the vas with an 0 silk tie and a 2-0 silk suture  ligature. The bundle that contained the blood supply was also tied with  an 0 silk tie and ligated with a 2-0 silk suture ligature. There was  good control of hemostasis. I then sort of tied the ends of the silk  stitches together to create sort of a wad of silk so that the bud end of  the cord could be identified. I then cut the tail end and the cord was  then allowed to retract into the ring. I then closed the ring with some  interrupted stitches of 2-0 Vicryl. I then copiously irrigated the  wound with antibiotic-containing irrigation solution. I then performed  a regional block with a 50:50 mixture of 1% lidocaine and 0.5% Marcaine  by injecting into the conjoint tendon medially and laterally into the  ilioinguinal nerve and into the ring. I let this to bathe in the tissue  for a period of time and this was then sponged out.   I then approximated  the aponeurosis that was opened at the beginning of the procedure and  approximated with a running stitch of 2-0 Vicryl. The external ring was  fairly open, so I placed a couple of interrupted stitches taking the  edge of the conjoint tendon and the pubic tubercle over to the inguinal  ligament _____ at this point as well. The wound was then copiously  irrigated with antibiotic-containing irrigation solution. Robbie's  fascia was closed using a running stitch of 2-0 Vicryl. Subcutaneous  tissue was closed using a running stitch of 2-0 chromic. The skin was  closed using a running subcuticular stitch of 4-0 Monocryl and a Prineo  dressing was then placed over the incision. Prior to closing the skin,  I did inject the subcutaneous and the skin with the local anesthetic. The scrotum was then wrapped with a Kerlix to compress it to prevent any  kind of swelling or fluid accumulation. An ice pack will be placed in  the recovery room. The left testicle and cord were sent for pathologic  examination. Estimated blood loss was 5 mL. There was no complication. The sponge, needle and instrument counts were correct at the end of the  case. The patient was awakened from his anesthetic and taken to the  recovery room in stable condition. He will follow up to see me in 2  weeks.         Milton Shoemaker MD    D: 02/01/2022 17:54:33      T: 02/02/2022 2:11:10     [email or web address]  Job#: [id number]     Doc#: [id number]    CC:

## 2022-02-02 NOTE — PROGRESS NOTES
I called the [facility] today to get him scheduled but they said Goldie Mejia would call us back to schedule him.  I went ahead and scheduled him for 3 weeks out, but they will call us back to confirm this appt is okay

## 2022-02-06 LAB — HCG TUMOR MARKER: 22 IU/L (ref 0–3)

## 2022-02-08 DIAGNOSIS — C62.92 SEMINOMA OF LEFT TESTIS (HCC): ICD-10-CM

## 2022-02-08 DIAGNOSIS — N50.89 MASS OF LEFT TESTICLE: Primary | ICD-10-CM

## 2022-02-10 ENCOUNTER — TELEPHONE (OUTPATIENT)
Dept: UROLOGY | Age: 42
End: 2022-02-10

## 2022-02-10 NOTE — TELEPHONE ENCOUNTER
[facility] from [facility] called stating the patient was seen and had surgery, she stated the patient was referred to see an oncologist by Dr. Lexy Godfrey but in order to get that going she needs the pathology report and office note showing when and why the patient needs to see an oncologist.  Ph. [phone] ext [phone] fax.  [phone]     Thank you,

## 2022-02-11 ENCOUNTER — TELEPHONE (OUTPATIENT)
Dept: UROLOGY | Age: 42
End: 2022-02-11

## 2022-02-11 NOTE — TELEPHONE ENCOUNTER
I called today to get the inmate rescheduled again but received her vm but the new appt is march 7th at 1:30. I have given [facility] 2 other dates but the appointment did not work with their transportation schedule. I did let them know this was supposed to be a 2 week follow up but it takes 2 weeks to get trans.  lined up

## 2022-02-11 NOTE — TELEPHONE ENCOUNTER
Leslee from [facility] called stating the patient needs to reschedule 2 week post op appointment due to transportation. [facility] states if possible could it be scheduled for either March 4th, 7th, or 8th after 12pm. You can reach the facility at [phone].      Thank you,

## 2022-02-22 DIAGNOSIS — C62.92 SEMINOMA OF TESTIS, LEFT (HCC): Primary | ICD-10-CM

## 2022-02-22 NOTE — TELEPHONE ENCOUNTER
[facility] called wanting to know if Dr. Ritesh Fox appt. Can also be scheduled with oncology appt. Mae Jeffery it was mentioned he may need. States it's been approved and will fax consult notes.

## 2022-02-25 ENCOUNTER — TELEPHONE (OUTPATIENT)
Dept: HEMATOLOGY | Age: 42
End: 2022-02-25

## 2022-02-25 NOTE — TELEPHONE ENCOUNTER
Writer called [facility] at 11:40am and informed [employer] that pt had a npt appt with us today at 10:30am and intended to r/s. Health Services informed writer that the transportation  will be in contact with our office when they are back in office to r/s pt's appt.

## 2022-03-04 ENCOUNTER — TELEPHONE (OUTPATIENT)
Dept: UROLOGY | Age: 42
End: 2022-03-04

## 2022-03-04 NOTE — TELEPHONE ENCOUNTER
[facility] with [facility] called requesting records. Pt has appt with oncology and needs records sent. Please fax to [phone] ATT: [email or web address]. Can call [phone] to clarify.

## 2022-03-07 ENCOUNTER — OFFICE VISIT (OUTPATIENT)
Dept: HEMATOLOGY | Age: 42
End: 2022-03-07
Payer: COMMERCIAL

## 2022-03-07 ENCOUNTER — HOSPITAL ENCOUNTER (OUTPATIENT)
Dept: INFUSION THERAPY | Age: 42
Discharge: HOME | End: 2022-03-07
Payer: COMMERCIAL

## 2022-03-07 ENCOUNTER — OFFICE VISIT (OUTPATIENT)
Dept: UROLOGY | Age: 42
End: 2022-03-07
Payer: COMMERCIAL

## 2022-03-07 VITALS
DIASTOLIC BLOOD PRESSURE: 80 MMHG | HEART RATE: 80 BPM | WEIGHT: 210 LBS | SYSTOLIC BLOOD PRESSURE: 122 MMHG | HEIGHT: 67 IN | BODY MASS INDEX: 32.96 KG/M2

## 2022-03-07 VITALS
WEIGHT: 150 LBS | OXYGEN SATURATION: 98 % | SYSTOLIC BLOOD PRESSURE: 130 MMHG | HEART RATE: 60 BPM | HEIGHT: 67 IN | DIASTOLIC BLOOD PRESSURE: 82 MMHG | BODY MASS INDEX: 23.54 KG/M2

## 2022-03-07 DIAGNOSIS — R59.0 RETROPERITONEAL LYMPHADENOPATHY: ICD-10-CM

## 2022-03-07 DIAGNOSIS — C62.92 SEMINOMA OF LEFT TESTIS (HCC): ICD-10-CM

## 2022-03-07 DIAGNOSIS — Z90.79 STATUS POST ORCHIECTOMY: Primary | ICD-10-CM

## 2022-03-07 DIAGNOSIS — C62.92 SEMINOMA OF TESTIS, LEFT (HCC): Primary | ICD-10-CM

## 2022-03-07 DIAGNOSIS — Z71.89 COORDINATION OF COMPLEX CARE: ICD-10-CM

## 2022-03-07 DIAGNOSIS — C62.92 SEMINOMA OF TESTIS, LEFT (HCC): ICD-10-CM

## 2022-03-07 DIAGNOSIS — Z71.89 CARE PLAN DISCUSSED WITH PATIENT: ICD-10-CM

## 2022-03-07 LAB
ALBUMIN SERPL-MCNC: 4.8 G/DL (ref 3.5–5.2)
ALP BLD-CCNC: 75 U/L (ref 40–130)
ALPHA FETOPROTEIN: 2.7 NG/ML (ref 0–8.3)
ALT SERPL-CCNC: 43 U/L (ref 21–72)
ANION GAP SERPL CALCULATED.3IONS-SCNC: 11 MMOL/L (ref 7–19)
APPEARANCE FLUID: CLEAR
AST SERPL-CCNC: 41 U/L (ref 17–59)
BILIRUB SERPL-MCNC: 1.4 MG/DL (ref 0.2–1.3)
BILIRUBIN, POC: NORMAL
BLOOD URINE, POC: NORMAL
BUN BLDV-MCNC: 17 MG/DL (ref 9–20)
CALCIUM SERPL-MCNC: 9.7 MG/DL (ref 8.4–10.2)
CHLORIDE BLD-SCNC: 104 MMOL/L (ref 98–111)
CLARITY, POC: CLEAR
CO2: 26 MMOL/L (ref 22–29)
COLOR, POC: YELLOW
CREAT SERPL-MCNC: 1.1 MG/DL (ref 0.6–1.2)
GFR NON-AFRICAN AMERICAN: >60
GLOBULIN: 2.8 G/DL
GLUCOSE BLD-MCNC: 94 MG/DL (ref 74–106)
GLUCOSE URINE, POC: NORMAL
HCT VFR BLD CALC: 43 % (ref 40.1–51)
HEMOGLOBIN: 15.4 G/DL (ref 13.7–17.5)
KETONES, POC: NORMAL
LEUKOCYTE EST, POC: NORMAL
MCH RBC QN AUTO: 32 PG (ref 25.7–32.2)
MCHC RBC AUTO-ENTMCNC: 35.8 G/DL (ref 32.3–36.5)
MCV RBC AUTO: 89.4 FL (ref 79–92.2)
NITRITE, POC: NORMAL
PDW BLD-RTO: 12 % (ref 11.6–14.4)
PH, POC: 6
PLATELET # BLD: 188 K/UL (ref 163–337)
PMV BLD AUTO: 9.9 FL (ref 7.4–10.4)
POTASSIUM SERPL-SCNC: 4.6 MMOL/L (ref 3.5–5.1)
PROTEIN, POC: NORMAL
RBC # BLD: 4.81 M/UL (ref 4.63–6.08)
SODIUM BLD-SCNC: 141 MMOL/L (ref 137–145)
SPECIFIC GRAVITY, POC: 1.03
TOTAL PROTEIN: 7.6 G/DL (ref 6.3–8.2)
UROBILINOGEN, POC: 0.2
WBC # BLD: 5.86 K/UL (ref 4.23–9.07)

## 2022-03-07 PROCEDURE — 81002 URINALYSIS NONAUTO W/O SCOPE: CPT | Performed by: UROLOGY

## 2022-03-07 PROCEDURE — 99205 OFFICE O/P NEW HI 60 MIN: CPT | Performed by: INTERNAL MEDICINE

## 2022-03-07 PROCEDURE — 85027 COMPLETE CBC AUTOMATED: CPT

## 2022-03-07 PROCEDURE — G0463 HOSPITAL OUTPT CLINIC VISIT: HCPCS

## 2022-03-07 PROCEDURE — 99211 OFF/OP EST MAY X REQ PHY/QHP: CPT

## 2022-03-07 PROCEDURE — 80053 COMPREHEN METABOLIC PANEL: CPT

## 2022-03-07 PROCEDURE — 99024 POSTOP FOLLOW-UP VISIT: CPT | Performed by: UROLOGY

## 2022-03-07 ASSESSMENT — ENCOUNTER SYMPTOMS
EYE REDNESS: 0
WHEEZING: 0
VOMITING: 0
FACIAL SWELLING: 0
EYE DISCHARGE: 0
BACK PAIN: 0
SORE THROAT: 0
CHEST TIGHTNESS: 0
NAUSEA: 0
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
## 2022-03-10 LAB — HCG TUMOR MARKER: <1 IU/L (ref 0–3)

## 2022-05-03 ENCOUNTER — TELEPHONE (OUTPATIENT)
Dept: UROLOGY | Age: 42
End: 2022-05-03

## 2022-05-03 NOTE — TELEPHONE ENCOUNTER
Katherine Rueda from Dept of Mailbox called requesting copy of bill for surgery performed on patient 2-1-22 by Dr James Cadena    Faxed copy to [phone]

## (undated) DEVICE — GOWN,PREVENTION PLUS,XL,ST,24/CS: Brand: MEDLINE

## (undated) DEVICE — SUTURE CHROMIC GUT SZ 2-0 L27IN ABSRB BRN L36MM CT-1 1/2 811H

## (undated) DEVICE — GLOVE ORTHO 7 1/2   MSG9475

## (undated) DEVICE — SUTURE PERMAHAND SZ 2-0 L30IN NONABSORBABLE BLK SILK W/O A305H

## (undated) DEVICE — SUTURE MCRYL SZ 4-0 L18IN ABSRB UD L19MM PS-2 3/8 CIR PRIM Y496G

## (undated) DEVICE — SYSTEM SKIN CLSR 22CM DERMBND PRINEO

## (undated) DEVICE — SUTURE PERMA-HAND SZ 2-0 L30IN NONABSORBABLE BLK L26MM SH K833H

## (undated) DEVICE — PAD,ABDOMINAL,8"X10",ST,LF: Brand: MEDLINE

## (undated) DEVICE — SUTURE CHROMIC GUT SZ 2-0 L36IN ABSRB BRN SH L26MM 1/2 CIR G173H

## (undated) DEVICE — PACK,UNIVERSAL,NO GOWNS: Brand: MEDLINE

## (undated) DEVICE — Z DISCONTINUED PER MEDLINE NO SUB DRAIN SURG L18IN DIA3/8IN LTX PENROSE UNIF WALL THICKNESS

## (undated) DEVICE — STERILE LATEX POWDER FREE SURGICAL GLOVES WITH HYDROGEL COATING: Brand: PROTEXIS

## (undated) DEVICE — Z DUPLICATE USE 2392649 LARYNGOSCOPE VID TI SPECTRM LOPRO S4 GLIDESCOPE

## (undated) DEVICE — TRAY PREP DRY W/ PREM GLV 2 APPL 6 SPNG 2 UNDPD 1 OVERWRAP

## (undated) DEVICE — STANDARD HYPODERMIC NEEDLE,POLYPROPYLENE HUB: Brand: MONOJECT

## (undated) DEVICE — TOWEL,OR,DSP,ST,BLUE,DLX,4/PK,20PK/CS: Brand: MEDLINE

## (undated) DEVICE — SUTURE CHROMIC GUT SZ 3-0 L36IN ABSRB BRN L26MM SH 1/2 CIR G172H

## (undated) DEVICE — MINOR CDS: Brand: MEDLINE INDUSTRIES, INC.

## (undated) DEVICE — TUBE ET 8MM NSL ORAL BASIC CUF INTMED MURPHY EYE RADPQ MRK

## (undated) DEVICE — SUTURE PERMAHAND SZ 2-0 L30IN NONABSORBABLE BLK SH L26MM C016D

## (undated) DEVICE — BANDAGE,GAUZE,4.5"X4.1YD,STERILE,LF: Brand: MEDLINE

## (undated) DEVICE — SUTURE VCRL SZ 2-0 L36IN ABSRB UD L36MM CT-1 1/2 CIR J945H

## (undated) DEVICE — SPONGE SURG WHT KTNR DISECT RADPQ ST

## (undated) DEVICE — SUTURE PERMAHAND SZ 0 L30IN NONABSORBABLE BLK SILK BRAID A306H

## (undated) DEVICE — SOLUTION IV IRRIG POUR BRL 0.9% SODIUM CHL 2F7124

## (undated) DEVICE — ATHLETIC SUPPORTER LATEX FREE, XLARGE